# Patient Record
(demographics unavailable — no encounter records)

---

## 2025-06-12 NOTE — HISTORY OF PRESENT ILLNESS
[de-identified] : Ms. YULIA MCKINNON is a 70 year  old female who was referred by Dr. Panchito Oliva with the chief complaint of having an umbilical hernia.  She reports having this condition for over 30 years. She denies any trauma to the area, fever, nausea, vomiting, distension, night sweats and  loss of appetite.  Symptoms aggravated by cough  or straining.  she feel pain when she sleeps on her abdomen. She reports normal bowel movements.   -She denies  previous abdominal surgeries or  wound infections. Ms. MCKINNON  is s/p CT abdomen pelvis in [facility] radiology and it showed moderate fat containing umbilical hernia

## 2025-06-12 NOTE — PHYSICAL EXAM
[Alert] : alert [Oriented to Person] : oriented to person [Oriented to Place] : oriented to place [Oriented to Time] : oriented to time [Calm] : calm [de-identified] : She  is alert, well-groomed, and in NAD   [de-identified] : anicteric.  Nasal mucosa pink, septum midline. Oral mucosa pink.  Tongue midline, Pharynx without exudates.   [de-identified] : Neck supple. Trachea midline. Thyroid isthmus barely palpable, lobes not felt.   [de-identified] : diastasis recti abdominis incarcerated umbilical hernia, mildly tender.  The defect appears to be relatively small, less than 3 cm and the skin overlying the hernia is  purple color

## 2025-06-12 NOTE — CONSULT LETTER
[Dear  ___] : Dear  [unfilled], [Consult Letter:] : I had the pleasure of evaluating your patient, [unfilled]. [Please see my note below.] : Please see my note below. [Consult Closing:] : Thank you very much for allowing me to participate in the care of this patient.  If you have any questions, please do not hesitate to contact me. [Sincerely,] : Sincerely, [FreeTextEntry3] : Thomas Acosta MD, FACS

## 2025-06-12 NOTE — PLAN
[FreeTextEntry1] : Ms. MCKINNON  was told significance of findings, options, risks and benefits were explained.  Informed consent for laparoscopic/possible open  incarcerated umbilical hernia  repair , and potential risks, benefits and alternatives (surgical options were discussed including non-surgical options or the option of no surgery) to the planned surgery were discussed in depth.  All surgical options were discussed including non-surgical treatments.  She wishes to proceed with surgery.  We will plan for surgery on at the next available date, pending any required insurance pre-certification or pre-approval. She agrees to obtain any necessary pre-operative evaluations and testing prior to surgery. Patient advised to seek immediate medical attention with any acute change in symptoms or with the development of any new or worsening symptoms.  Patient's questions and concerns addressed to patient's satisfaction, and patient verbalized an understanding of the information discussed.